# Patient Record
Sex: FEMALE | Race: BLACK OR AFRICAN AMERICAN | NOT HISPANIC OR LATINO | Employment: FULL TIME | URBAN - METROPOLITAN AREA
[De-identification: names, ages, dates, MRNs, and addresses within clinical notes are randomized per-mention and may not be internally consistent; named-entity substitution may affect disease eponyms.]

---

## 2023-06-23 ENCOUNTER — CLINICAL SUPPORT (OUTPATIENT)
Dept: AUDIOLOGY | Facility: CLINIC | Age: 43
End: 2023-06-23
Payer: COMMERCIAL

## 2023-06-23 ENCOUNTER — OFFICE VISIT (OUTPATIENT)
Dept: OTOLARYNGOLOGY | Facility: CLINIC | Age: 43
End: 2023-06-23
Payer: COMMERCIAL

## 2023-06-23 DIAGNOSIS — H93.11 TINNITUS, RIGHT EAR: ICD-10-CM

## 2023-06-23 DIAGNOSIS — H91.91 DEAFNESS, RIGHT: ICD-10-CM

## 2023-06-23 DIAGNOSIS — D33.3 BENIGN NEOPLASM OF CRANIAL NERVE: ICD-10-CM

## 2023-06-23 DIAGNOSIS — G51.31 HEMIFACIAL SPASM OF RIGHT SIDE OF FACE: ICD-10-CM

## 2023-06-23 DIAGNOSIS — D36.10 SCHWANNOMA: Primary | ICD-10-CM

## 2023-06-23 DIAGNOSIS — H90.41 SENSORINEURAL HEARING LOSS (SNHL) OF RIGHT EAR WITH UNRESTRICTED HEARING OF LEFT EAR: Primary | ICD-10-CM

## 2023-06-23 PROCEDURE — 92567 PR TYMPA2METRY: ICD-10-PCS | Mod: S$GLB,,, | Performed by: AUDIOLOGIST

## 2023-06-23 PROCEDURE — 92557 COMPREHENSIVE HEARING TEST: CPT | Mod: S$GLB,,, | Performed by: AUDIOLOGIST

## 2023-06-23 PROCEDURE — 99999 PR PBB SHADOW E&M-NEW PATIENT-LVL II: ICD-10-PCS | Mod: PBBFAC,,, | Performed by: AUDIOLOGIST

## 2023-06-23 PROCEDURE — 1159F PR MEDICATION LIST DOCUMENTED IN MEDICAL RECORD: ICD-10-PCS | Mod: CPTII,S$GLB,, | Performed by: OTOLARYNGOLOGY

## 2023-06-23 PROCEDURE — 1159F MED LIST DOCD IN RCRD: CPT | Mod: CPTII,S$GLB,, | Performed by: OTOLARYNGOLOGY

## 2023-06-23 PROCEDURE — 99204 OFFICE O/P NEW MOD 45 MIN: CPT | Mod: S$GLB,,, | Performed by: OTOLARYNGOLOGY

## 2023-06-23 PROCEDURE — 99999 PR PBB SHADOW E&M-NEW PATIENT-LVL II: CPT | Mod: PBBFAC,,, | Performed by: AUDIOLOGIST

## 2023-06-23 PROCEDURE — 99204 PR OFFICE/OUTPT VISIT, NEW, LEVL IV, 45-59 MIN: ICD-10-PCS | Mod: S$GLB,,, | Performed by: OTOLARYNGOLOGY

## 2023-06-23 PROCEDURE — 99999 PR PBB SHADOW E&M-EST. PATIENT-LVL II: CPT | Mod: PBBFAC,,, | Performed by: OTOLARYNGOLOGY

## 2023-06-23 PROCEDURE — 92557 PR COMPREHENSIVE HEARING TEST: ICD-10-PCS | Mod: S$GLB,,, | Performed by: AUDIOLOGIST

## 2023-06-23 PROCEDURE — 92567 TYMPANOMETRY: CPT | Mod: S$GLB,,, | Performed by: AUDIOLOGIST

## 2023-06-23 PROCEDURE — 99999 PR PBB SHADOW E&M-EST. PATIENT-LVL II: ICD-10-PCS | Mod: PBBFAC,,, | Performed by: OTOLARYNGOLOGY

## 2023-06-23 RX ORDER — ERGOCALCIFEROL 1.25 MG/1
50000 CAPSULE ORAL
COMMUNITY
Start: 2023-04-11

## 2023-06-23 RX ORDER — BACLOFEN 10 MG/1
10 TABLET ORAL
COMMUNITY

## 2023-06-23 NOTE — PROGRESS NOTES
Otology/Neurotology History and Physical     CC: Facial Nerve Schwannoma    HPI:  Мария Larry is a 42 y.o. female who was referred to me by Dr Terry ENT for a right facial nerve schwannoma. Patient states that she first noticed a decline in hearing roughly 8 years ago and totally lost her hearing roughly 3 year ago. She also has a several year history of hemifacial spasm but this has not been an issue in some times. She denies any other neur otologic symptoms. Her last MRI was 4/2023 which showed a 9x6 mm intracanicular mass extending into the geniculate ganglion.     Past Medical History  She has no past medical history on file.    Past Surgical History  She has no past surgical history on file.    Family History  Her family history is not on file.    Social History  She     Allergies  She has No Known Allergies.    Medications  She has a current medication list which includes the following prescription(s): baclofen and ergocalciferol.    Review of Systems       Objective:     There were no vitals taken for this visit.   Physical Exam  Constitutional: Well appearing/communicating. Voice clear. NAD.  Eyes: EOM I Bilaterally  Head/Face: Normocephalic.  House Brackmann I Bilaterally.  Right Ear: See microscopy  Left Ear: See microscopy  Nose: No gross nasal septal deviation. Inferior Turbinates 2+ bilaterally. No septal perforation. No masses/lesions. External nasal skin without masses/lesions.  Oral Cavity: Gingiva/lips WNL. Oral Tongue mobile. Hard Palate WNL.   Oropharynx: Tonsillar fossa/pharyngeal wall without lesions. Posterior oropharynx WNL.  Soft palate without masses. Midline uvula.   Neck/Lymphatic: No LAD I-VI bilaterally.  No thyromegaly.  No masses noted on exam.  Neuro/Psychiatric: AOx3.  Normal mood and affect.   Respiratory: Normal respiratory effort, no stridor/stertor, no retractions noted.      Procedure  Ear Microscopy:    The patient was laid supine in the minor procedure room. A microscope  was used to examine the ear. Any cerumen or otorrhea was removed with suction and instrumentation. Findings below.     AD: EAC wnl. TM intact without ALPA.   AS: EAC wnl. TM intact without ALPA    Data Reviewed      I have independently reviewed the above information. Findings include:  Proufound SNHL AD with type A tympanometry       Assessment:     1. Schwannoma         Plan:   MRI IAC in 6 months to monitor growth  Discussed hearing rehabilitation including CROS, BAHA, and potentially CI

## 2023-06-23 NOTE — LETTER
June 23, 2023        Semaj Terry MD  [address]  [place] [address]             [facility] [address]  [address]  [facility] [address]  Phone: [phone]  Fax: [phone]   Patient: Мария Larry   MR Number: [id number]   YOB: 1980   Date of Visit: 6/23/2023       Dear Dr. Terry:    Thank you for referring Мария Larry to me for evaluation. Below are the relevant portions of my assessment and plan of care.            If you have questions, please do not hesitate to call me. I look forward to following Мария along with you.    Sincerely,      Aidan Conley MD           CC  No Recipients

## 2023-06-23 NOTE — PROGRESS NOTES
"Мария Larry, a 42 y.o. female, was seen today in the clinic for an audiologic evaluation.  Patient reported possible tumor in the right ear. Patient also reported hearing loss in the right ear and a "whooshing sound" similar to wind in the right ear. Patient denied vertigo.     Tympanometry revealed Type A in the right ear and Type A in the left ear. Audiogram results revealed profound sensorineural hearing loss in the right ear and normal hearing sensitivity in the left ear.  Speech reception thresholds was noted at 20 dB in the left ear and no response was obtained at 105 dB HL in the right ear.  Speech discrimination score was 100% in the left ear and could not be obtained in the right ear due to the severity of the hearing loss.    Recommendations:  Otologic evaluation  Repeat audiogram as needed  Hearing protection when in noise  Amplification consultation        "